# Patient Record
Sex: MALE | Race: BLACK OR AFRICAN AMERICAN
[De-identification: names, ages, dates, MRNs, and addresses within clinical notes are randomized per-mention and may not be internally consistent; named-entity substitution may affect disease eponyms.]

---

## 2019-11-07 ENCOUNTER — HOSPITAL ENCOUNTER (INPATIENT)
Dept: HOSPITAL 15 - ER | Age: 54
LOS: 9 days | Discharge: HOME | DRG: 383 | End: 2019-11-16
Attending: HOSPITALIST | Admitting: NURSE PRACTITIONER
Payer: COMMERCIAL

## 2019-11-07 VITALS — HEIGHT: 73 IN | WEIGHT: 242.51 LBS | BODY MASS INDEX: 32.14 KG/M2

## 2019-11-07 DIAGNOSIS — Z80.42: ICD-10-CM

## 2019-11-07 DIAGNOSIS — Z86.73: ICD-10-CM

## 2019-11-07 DIAGNOSIS — E55.9: ICD-10-CM

## 2019-11-07 DIAGNOSIS — I13.0: ICD-10-CM

## 2019-11-07 DIAGNOSIS — N18.9: ICD-10-CM

## 2019-11-07 DIAGNOSIS — B95.62: ICD-10-CM

## 2019-11-07 DIAGNOSIS — D63.1: ICD-10-CM

## 2019-11-07 DIAGNOSIS — E11.21: ICD-10-CM

## 2019-11-07 DIAGNOSIS — B96.89: ICD-10-CM

## 2019-11-07 DIAGNOSIS — D50.9: ICD-10-CM

## 2019-11-07 DIAGNOSIS — Z91.19: ICD-10-CM

## 2019-11-07 DIAGNOSIS — E11.622: ICD-10-CM

## 2019-11-07 DIAGNOSIS — Z82.49: ICD-10-CM

## 2019-11-07 DIAGNOSIS — I21.4: ICD-10-CM

## 2019-11-07 DIAGNOSIS — E44.0: ICD-10-CM

## 2019-11-07 DIAGNOSIS — E11.65: ICD-10-CM

## 2019-11-07 DIAGNOSIS — E11.22: ICD-10-CM

## 2019-11-07 DIAGNOSIS — D72.829: ICD-10-CM

## 2019-11-07 DIAGNOSIS — N17.9: ICD-10-CM

## 2019-11-07 DIAGNOSIS — B96.4: ICD-10-CM

## 2019-11-07 DIAGNOSIS — L97.926: ICD-10-CM

## 2019-11-07 DIAGNOSIS — L03.115: Primary | ICD-10-CM

## 2019-11-07 DIAGNOSIS — E87.6: ICD-10-CM

## 2019-11-07 DIAGNOSIS — G47.00: ICD-10-CM

## 2019-11-07 DIAGNOSIS — Z79.899: ICD-10-CM

## 2019-11-07 DIAGNOSIS — L97.916: ICD-10-CM

## 2019-11-07 DIAGNOSIS — L03.116: ICD-10-CM

## 2019-11-07 DIAGNOSIS — E83.39: ICD-10-CM

## 2019-11-07 DIAGNOSIS — I50.30: ICD-10-CM

## 2019-11-07 LAB
ALBUMIN SERPL-MCNC: 1.9 G/DL (ref 3.4–5)
ALP SERPL-CCNC: 71 U/L (ref 45–117)
ALT SERPL-CCNC: 17 U/L (ref 16–61)
ANION GAP SERPL CALCULATED.3IONS-SCNC: 9 MMOL/L (ref 5–15)
APTT PPP: 33 SEC (ref 23.64–32.05)
BILIRUB SERPL-MCNC: 0.4 MG/DL (ref 0.2–1)
BUN SERPL-MCNC: 17 MG/DL (ref 7–18)
BUN/CREAT SERPL: 10.3
CALCIUM SERPL-MCNC: 8.2 MG/DL (ref 8.5–10.1)
CHLORIDE SERPL-SCNC: 106 MMOL/L (ref 98–107)
CO2 SERPL-SCNC: 24 MMOL/L (ref 21–32)
GLUCOSE SERPL-MCNC: 99 MG/DL (ref 74–106)
HCT VFR BLD AUTO: 20.7 % (ref 41–53)
HGB BLD-MCNC: 6.4 G/DL (ref 13.5–17.5)
INR PPP: 1.06 (ref 0.9–1.15)
LACTATE PLASV-SCNC: 2.1 MMOL/L (ref 0.4–2)
MAGNESIUM SERPL-MCNC: 2.1 MG/DL (ref 1.6–2.6)
MCH RBC QN AUTO: 24.2 PG (ref 28–32)
MCV RBC AUTO: 78.9 FL (ref 80–100)
NRBC BLD QL AUTO: 0 %
POTASSIUM SERPL-SCNC: 3.6 MMOL/L (ref 3.5–5.1)
PROT SERPL-MCNC: 8 G/DL (ref 6.4–8.2)
SODIUM SERPL-SCNC: 139 MMOL/L (ref 136–145)

## 2019-11-07 PROCEDURE — 85730 THROMBOPLASTIN TIME PARTIAL: CPT

## 2019-11-07 PROCEDURE — 83036 HEMOGLOBIN GLYCOSYLATED A1C: CPT

## 2019-11-07 PROCEDURE — 86901 BLOOD TYPING SEROLOGIC RH(D): CPT

## 2019-11-07 PROCEDURE — 83735 ASSAY OF MAGNESIUM: CPT

## 2019-11-07 PROCEDURE — 80053 COMPREHEN METABOLIC PANEL: CPT

## 2019-11-07 PROCEDURE — 82306 VITAMIN D 25 HYDROXY: CPT

## 2019-11-07 PROCEDURE — 87205 SMEAR GRAM STAIN: CPT

## 2019-11-07 PROCEDURE — 80202 ASSAY OF VANCOMYCIN: CPT

## 2019-11-07 PROCEDURE — 76775 US EXAM ABDO BACK WALL LIM: CPT

## 2019-11-07 PROCEDURE — 83970 ASSAY OF PARATHORMONE: CPT

## 2019-11-07 PROCEDURE — 83550 IRON BINDING TEST: CPT

## 2019-11-07 PROCEDURE — 36415 COLL VENOUS BLD VENIPUNCTURE: CPT

## 2019-11-07 PROCEDURE — 85610 PROTHROMBIN TIME: CPT

## 2019-11-07 PROCEDURE — 87081 CULTURE SCREEN ONLY: CPT

## 2019-11-07 PROCEDURE — 71045 X-RAY EXAM CHEST 1 VIEW: CPT

## 2019-11-07 PROCEDURE — 73700 CT LOWER EXTREMITY W/O DYE: CPT

## 2019-11-07 PROCEDURE — 86920 COMPATIBILITY TEST SPIN: CPT

## 2019-11-07 PROCEDURE — 86850 RBC ANTIBODY SCREEN: CPT

## 2019-11-07 PROCEDURE — 93306 TTE W/DOPPLER COMPLETE: CPT

## 2019-11-07 PROCEDURE — 84550 ASSAY OF BLOOD/URIC ACID: CPT

## 2019-11-07 PROCEDURE — 93926 LOWER EXTREMITY STUDY: CPT

## 2019-11-07 PROCEDURE — 85027 COMPLETE CBC AUTOMATED: CPT

## 2019-11-07 PROCEDURE — 83540 ASSAY OF IRON: CPT

## 2019-11-07 PROCEDURE — 86900 BLOOD TYPING SEROLOGIC ABO: CPT

## 2019-11-07 PROCEDURE — 78315 BONE IMAGING 3 PHASE: CPT

## 2019-11-07 PROCEDURE — 86141 C-REACTIVE PROTEIN HS: CPT

## 2019-11-07 PROCEDURE — 96361 HYDRATE IV INFUSION ADD-ON: CPT

## 2019-11-07 PROCEDURE — 85007 BL SMEAR W/DIFF WBC COUNT: CPT

## 2019-11-07 PROCEDURE — 87040 BLOOD CULTURE FOR BACTERIA: CPT

## 2019-11-07 PROCEDURE — 84484 ASSAY OF TROPONIN QUANT: CPT

## 2019-11-07 PROCEDURE — 80048 BASIC METABOLIC PNL TOTAL CA: CPT

## 2019-11-07 PROCEDURE — 87186 SC STD MICRODIL/AGAR DIL: CPT

## 2019-11-07 PROCEDURE — 87077 CULTURE AEROBIC IDENTIFY: CPT

## 2019-11-07 PROCEDURE — 82962 GLUCOSE BLOOD TEST: CPT

## 2019-11-07 PROCEDURE — 80307 DRUG TEST PRSMV CHEM ANLYZR: CPT

## 2019-11-07 PROCEDURE — 83880 ASSAY OF NATRIURETIC PEPTIDE: CPT

## 2019-11-07 PROCEDURE — 96365 THER/PROPH/DIAG IV INF INIT: CPT

## 2019-11-07 PROCEDURE — 81001 URINALYSIS AUTO W/SCOPE: CPT

## 2019-11-07 PROCEDURE — 85025 COMPLETE CBC W/AUTO DIFF WBC: CPT

## 2019-11-07 PROCEDURE — 82270 OCCULT BLOOD FECES: CPT

## 2019-11-07 PROCEDURE — 84100 ASSAY OF PHOSPHORUS: CPT

## 2019-11-07 PROCEDURE — 83605 ASSAY OF LACTIC ACID: CPT

## 2019-11-07 PROCEDURE — 93005 ELECTROCARDIOGRAM TRACING: CPT

## 2019-11-07 RX ADMIN — FAMOTIDINE SCH MG: 20 TABLET, FILM COATED ORAL at 23:08

## 2019-11-08 VITALS — SYSTOLIC BLOOD PRESSURE: 124 MMHG | DIASTOLIC BLOOD PRESSURE: 76 MMHG

## 2019-11-08 VITALS — DIASTOLIC BLOOD PRESSURE: 71 MMHG | SYSTOLIC BLOOD PRESSURE: 130 MMHG

## 2019-11-08 VITALS — SYSTOLIC BLOOD PRESSURE: 141 MMHG | DIASTOLIC BLOOD PRESSURE: 76 MMHG

## 2019-11-08 VITALS — DIASTOLIC BLOOD PRESSURE: 69 MMHG | SYSTOLIC BLOOD PRESSURE: 142 MMHG

## 2019-11-08 VITALS — DIASTOLIC BLOOD PRESSURE: 90 MMHG | SYSTOLIC BLOOD PRESSURE: 129 MMHG

## 2019-11-08 VITALS — DIASTOLIC BLOOD PRESSURE: 93 MMHG | SYSTOLIC BLOOD PRESSURE: 148 MMHG

## 2019-11-08 VITALS — SYSTOLIC BLOOD PRESSURE: 139 MMHG | DIASTOLIC BLOOD PRESSURE: 68 MMHG

## 2019-11-08 VITALS — DIASTOLIC BLOOD PRESSURE: 97 MMHG | SYSTOLIC BLOOD PRESSURE: 148 MMHG

## 2019-11-08 VITALS — SYSTOLIC BLOOD PRESSURE: 147 MMHG | DIASTOLIC BLOOD PRESSURE: 94 MMHG

## 2019-11-08 VITALS — DIASTOLIC BLOOD PRESSURE: 63 MMHG | SYSTOLIC BLOOD PRESSURE: 117 MMHG

## 2019-11-08 LAB
ALBUMIN SERPL-MCNC: 1.6 G/DL (ref 3.4–5)
ALCOHOL, URINE: < 3 MG/DL (ref 0–5)
ALP SERPL-CCNC: 62 U/L (ref 45–117)
ALT SERPL-CCNC: 14 U/L (ref 16–61)
AMPHETAMINES UR QL SCN: NEGATIVE
ANION GAP SERPL CALCULATED.3IONS-SCNC: 5 MMOL/L (ref 5–15)
BARBITURATES UR QL SCN: NEGATIVE
BENZODIAZ UR QL SCN: NEGATIVE
BILIRUB SERPL-MCNC: 1 MG/DL (ref 0.2–1)
BUN SERPL-MCNC: 14 MG/DL (ref 7–18)
BUN/CREAT SERPL: 8.9
BZE UR QL SCN: NEGATIVE
CALCIUM SERPL-MCNC: 7.2 MG/DL (ref 8.5–10.1)
CANNABINOIDS UR QL SCN: NEGATIVE
CHLORIDE SERPL-SCNC: 106 MMOL/L (ref 98–107)
CO2 SERPL-SCNC: 25 MMOL/L (ref 21–32)
GLUCOSE SERPL-MCNC: 150 MG/DL (ref 74–106)
HCT VFR BLD AUTO: 21.6 % (ref 41–53)
HGB BLD-MCNC: 6.7 G/DL (ref 13.5–17.5)
IRON SERPL-MCNC: 7 UG/DL (ref 65–175)
MCH RBC QN AUTO: 24.9 PG (ref 28–32)
MCV RBC AUTO: 79.7 FL (ref 80–100)
OPIATES UR QL SCN: NEGATIVE
PCP UR QL SCN: NEGATIVE
POTASSIUM SERPL-SCNC: 3.8 MMOL/L (ref 3.5–5.1)
PROT SERPL-MCNC: 6.8 G/DL (ref 6.4–8.2)
SODIUM SERPL-SCNC: 136 MMOL/L (ref 136–145)
TIBC SERPL-MCNC: 118 UG/DL (ref 250–450)

## 2019-11-08 PROCEDURE — 30233N1 TRANSFUSION OF NONAUTOLOGOUS RED BLOOD CELLS INTO PERIPHERAL VEIN, PERCUTANEOUS APPROACH: ICD-10-PCS | Performed by: NURSE PRACTITIONER

## 2019-11-08 RX ADMIN — VANCOMYCIN HYDROCHLORIDE SCH MLS/HR: 750 INJECTION, POWDER, LYOPHILIZED, FOR SOLUTION INTRAVENOUS at 18:09

## 2019-11-08 RX ADMIN — CEFTRIAXONE SODIUM SCH MLS/HR: 1 INJECTION, POWDER, FOR SOLUTION INTRAMUSCULAR; INTRAVENOUS at 09:14

## 2019-11-08 RX ADMIN — Medication SCH STRIP: at 17:51

## 2019-11-08 RX ADMIN — VANCOMYCIN HYDROCHLORIDE SCH MLS/HR: 750 INJECTION, POWDER, LYOPHILIZED, FOR SOLUTION INTRAVENOUS at 06:16

## 2019-11-08 RX ADMIN — ACETAMINOPHEN PRN MG: 325 TABLET ORAL at 20:26

## 2019-11-08 RX ADMIN — FAMOTIDINE SCH MG: 20 TABLET, FILM COATED ORAL at 09:14

## 2019-11-08 RX ADMIN — Medication SCH STRIP: at 00:00

## 2019-11-08 RX ADMIN — HUMAN INSULIN SCH UNITS: 100 INJECTION, SOLUTION SUBCUTANEOUS at 06:00

## 2019-11-08 RX ADMIN — ACETAMINOPHEN PRN MG: 325 TABLET ORAL at 01:00

## 2019-11-08 RX ADMIN — HUMAN INSULIN SCH UNITS: 100 INJECTION, SOLUTION SUBCUTANEOUS at 00:00

## 2019-11-08 RX ADMIN — METOPROLOL TARTRATE SCH MG: 25 TABLET, FILM COATED ORAL at 21:50

## 2019-11-08 RX ADMIN — MUPIROCIN SCH APPLIC: 20 OINTMENT TOPICAL at 21:50

## 2019-11-08 RX ADMIN — FUROSEMIDE SCH MG: 10 INJECTION, SOLUTION INTRAMUSCULAR; INTRAVENOUS at 10:32

## 2019-11-08 RX ADMIN — METOPROLOL TARTRATE SCH MG: 25 TABLET, FILM COATED ORAL at 10:33

## 2019-11-08 RX ADMIN — HUMAN INSULIN SCH UNITS: 100 INJECTION, SOLUTION SUBCUTANEOUS at 12:00

## 2019-11-08 RX ADMIN — Medication SCH STRIP: at 12:14

## 2019-11-08 RX ADMIN — HUMAN INSULIN SCH UNITS: 100 INJECTION, SOLUTION SUBCUTANEOUS at 23:24

## 2019-11-08 RX ADMIN — HUMAN INSULIN SCH UNITS: 100 INJECTION, SOLUTION SUBCUTANEOUS at 18:09

## 2019-11-08 RX ADMIN — PANTOPRAZOLE SODIUM SCH MG: 40 TABLET, DELAYED RELEASE ORAL at 21:51

## 2019-11-08 RX ADMIN — Medication SCH STRIP: at 06:16

## 2019-11-08 RX ADMIN — Medication SCH STRIP: at 23:24

## 2019-11-08 NOTE — NUR
PATIENT AT THIS TIME REFUSING DRESSING CHANGE



ATTEMPTED TO REMOVE DRESSINGS AND TAKE WOUND CARE PHOTOS. PATIENT AT THIS TIME REFUSING 
STATING THAT IT HURTS AND WANTS IT LEFT ALONE. WILL ATTEMPT AT ANOTHER TIME.

## 2019-11-08 NOTE — NUR
PT REFUSING MEDICATION AT THIS TIME. PT STATED HE "WAS NOT GOING TO TAKE ANY MEDICATION" I 
EDUCATED ON THE NEED FOR IT. PT VERBALIZED UNDERSTANDING. WILL CONTINUE TO MONITOR.

## 2019-11-08 NOTE — NUR
Opening Shift Note

Assumed care of patient, awake and alert.  No S/S of distress/SOB or pain.  Instructed on 
POC and to call for assist PRN, will continue to monitor for changes Q1hr and PRN. Bed set 
in lowest locked position with side rails up x 2 for safety and call light within reach.

## 2019-11-08 NOTE — NUR
New orders received from MD [place], Dr. Younger, ordered Bactroban to be administered to both nares for 5 days.

## 2019-11-08 NOTE — NUR
PAGED DR. ROBISON. PT Hg IS AT 6.7 AFTER 1 UNIT FO BLOOD. Hg WAS AT 6.4. LEFT MESSAGE TO INFORM 
AND CALL BACK. AWAITING CALL BACK. NOTIFIED CHARGE, LY IBRAHIM.

## 2019-11-08 NOTE — NUR
RECEIVED PATIENT FROM ER.



REPORT RECEIVED FROM LAUREN MODI. PATIENT SHOWING NO SIGN OF DISTRESS, SHORTNESS OF BREATH, 
AND PATIENT DENIES ANY PAIN AT THIS TIME. NEW IV STARTED IN LEFT HAND 22 GAUGE. LAB AT 
BEDSIDE AND LACTIC ACID DRAWN. BLOOD TRANSFUSION CONSENT SIGNED AT THIS TIME. BP ELEVATED AT 
161/77. TEMP .5 HEART RATE . WILL ATTEMPT TO STABILIZE VITALS PRIOR TO BLOOD 
TRANSFUSION. EDUCATED PATIENT ON POC. PATIENT VERBALIZED UNDERSTANDING. BED LOWERED, CALL 
LIGHT WITHIN REACH, AND PATIENT WILL BE ROUNDED ON EVERY HOUR AND AS NEEDED.

## 2019-11-08 NOTE — NUR
CONTACTED HOSPITALIST



RECEIVED SIGNATURE FOR BLOOD TRANSFUSION. REQUESTED ADDITIONAL MEDICATION FOR PATIENT'S 
ELEVATED BP /77. ORDERS RECEIVED. WILL PLACE ORDERS. WILL CONTINUE TO MONITOR PATIENT 
AT THIS TIME.

## 2019-11-08 NOTE — NUR
DRESSING CHANGE PERFORMED



WOUND CARE PHOTOS TAKEN AND CULTURE SWABS OBTAINED. DRESSINGS CHANGED AND PLACED WITH 
ABDOMINAL PADS AND KERLEX. PATIENT TOLERATED WELL. WILL CONTINUE TO MONITOR. WOUND CONSULT 
ALREADY IN PLACE. WILL INFORM DAYSHIFT RN.

## 2019-11-08 NOTE — NUR
WOUND CARE NOTE: 

Wound Care in to see patient per wound care request regarding "bilateral leg wounds"  that 
are noted present on admission. Bedside nurse took photograph of patient's wounds  upon 
admission for reference. Patient is 54 years old male with admitting diagnosis of Bilateral 
Infected Leg wounds. Patient with history of  DM.   Patient is resting in bed in Rm. 274A. 
He's  awake, alert and oriented. He's in no stated pain at this time, however reports of 
pain to BLE wounds when touched. He's able to assist in turning and repositioning. 



Skin/wound assessment done with the assistance of  orienting nurse, LY Donaldson. Noted  Rt. 
(10s61s1.8cm) and Lt (21q20v6.5cm) has large open, draining wounds. Wounds are in whole 
lower leg and larger to lateral and medial area. Anterior distal Rt lower leg  wound has 
visible tendon. Wound beds are pale pink with yellow slough, dark red periwound,  moderate 
seropurulent  drainage noted with foul odor noted.  Wound culture specimen reported sent to 
lab and in process. Patient reported that wound started "since January or February" but 
admitted to not seeing a doctor for it  "only [facility]". Cleansed BLE 
wounds with wound cleanser,patted dry with gauze, applied Thera honey gel/honey gauze, 
applied petrolatum gauze to exposed tendon to anterior distal Rt lower leg wound, covered 
with Opti lock and absorbent pads, wrapped with Kerlix and secured with tape. Patient 
tolerated well.  Patient turned to his side to visualize sacrum and back, no pressure injury 
noted.  Patient's education given regarding skin/ wound care, wound healing and skin 
protection measures. Patient verbalized understanding. Repositioned patient for comfort. 
Patient has pending surgical consult for BLE wounds. 



RECOMMENDATION: Daily/PRN dressing change to BLE wound per MD order, dietary 
consult,surgical consult, frequent turning and repositioning schedule as condition permits, 
redistribute pressure points with pillows, elevate BLE on pillows to help offload pressure, 
continue monitoring by wound care while patient is hospitalized.

-------------------------------------------------------------------------------

Addendum: 11/08/19 at 1715 by Domi Peña RN

-------------------------------------------------------------------------------

Amended: Links added.

## 2019-11-08 NOTE — NUR
Paged MD for change of isolation status.

Awaiting call back, patient is positive for MRSA in nares.

## 2019-11-08 NOTE — NUR
MONITOR TECH CALLED. PT HAD RUN OF RVR. PT ASYMPTOMATIC. /85, HR 86, 98%. PER DAY 
SHIFT HE HAD A RUN DURING THE DAY AND THEY SAID MD IS AWARE. WILL CONTINUE TO MONITOR.

## 2019-11-09 VITALS — SYSTOLIC BLOOD PRESSURE: 142 MMHG | DIASTOLIC BLOOD PRESSURE: 71 MMHG

## 2019-11-09 VITALS — SYSTOLIC BLOOD PRESSURE: 149 MMHG | DIASTOLIC BLOOD PRESSURE: 78 MMHG

## 2019-11-09 VITALS — DIASTOLIC BLOOD PRESSURE: 78 MMHG | SYSTOLIC BLOOD PRESSURE: 151 MMHG

## 2019-11-09 VITALS — DIASTOLIC BLOOD PRESSURE: 91 MMHG | SYSTOLIC BLOOD PRESSURE: 152 MMHG

## 2019-11-09 VITALS — SYSTOLIC BLOOD PRESSURE: 144 MMHG | DIASTOLIC BLOOD PRESSURE: 87 MMHG

## 2019-11-09 VITALS — DIASTOLIC BLOOD PRESSURE: 74 MMHG | SYSTOLIC BLOOD PRESSURE: 119 MMHG

## 2019-11-09 VITALS — DIASTOLIC BLOOD PRESSURE: 80 MMHG | SYSTOLIC BLOOD PRESSURE: 121 MMHG

## 2019-11-09 LAB
ANION GAP SERPL CALCULATED.3IONS-SCNC: 6 MMOL/L (ref 5–15)
BUN SERPL-MCNC: 17 MG/DL (ref 7–18)
BUN/CREAT SERPL: 11.5
CALCIUM SERPL-MCNC: 7.6 MG/DL (ref 8.5–10.1)
CHLORIDE SERPL-SCNC: 106 MMOL/L (ref 98–107)
CO2 SERPL-SCNC: 25 MMOL/L (ref 21–32)
GLUCOSE SERPL-MCNC: 136 MG/DL (ref 74–106)
HCT VFR BLD AUTO: 22.6 % (ref 41–53)
HGB BLD-MCNC: 7.1 G/DL (ref 13.5–17.5)
MCH RBC QN AUTO: 25.7 PG (ref 28–32)
MCV RBC AUTO: 82.2 FL (ref 80–100)
NRBC BLD QL AUTO: 0 %
POTASSIUM SERPL-SCNC: 3.6 MMOL/L (ref 3.5–5.1)
SODIUM SERPL-SCNC: 137 MMOL/L (ref 136–145)

## 2019-11-09 RX ADMIN — VANCOMYCIN HYDROCHLORIDE SCH MLS/HR: 750 INJECTION, POWDER, LYOPHILIZED, FOR SOLUTION INTRAVENOUS at 18:44

## 2019-11-09 RX ADMIN — ACETAMINOPHEN PRN MG: 325 TABLET ORAL at 15:06

## 2019-11-09 RX ADMIN — MUPIROCIN SCH APPLIC: 20 OINTMENT TOPICAL at 15:12

## 2019-11-09 RX ADMIN — METOPROLOL TARTRATE SCH MG: 25 TABLET, FILM COATED ORAL at 22:23

## 2019-11-09 RX ADMIN — HUMAN INSULIN SCH UNITS: 100 INJECTION, SOLUTION SUBCUTANEOUS at 18:00

## 2019-11-09 RX ADMIN — HUMAN INSULIN SCH UNITS: 100 INJECTION, SOLUTION SUBCUTANEOUS at 23:28

## 2019-11-09 RX ADMIN — CEFTRIAXONE SODIUM SCH MLS/HR: 1 INJECTION, POWDER, FOR SOLUTION INTRAMUSCULAR; INTRAVENOUS at 09:53

## 2019-11-09 RX ADMIN — PANTOPRAZOLE SODIUM SCH MG: 40 TABLET, DELAYED RELEASE ORAL at 09:55

## 2019-11-09 RX ADMIN — HUMAN INSULIN SCH UNITS: 100 INJECTION, SOLUTION SUBCUTANEOUS at 06:00

## 2019-11-09 RX ADMIN — Medication SCH STRIP: at 18:00

## 2019-11-09 RX ADMIN — ACETAMINOPHEN PRN MG: 325 TABLET ORAL at 03:50

## 2019-11-09 RX ADMIN — MUPIROCIN SCH APPLIC: 20 OINTMENT TOPICAL at 22:22

## 2019-11-09 RX ADMIN — Medication SCH STRIP: at 23:21

## 2019-11-09 RX ADMIN — METOPROLOL TARTRATE SCH MG: 25 TABLET, FILM COATED ORAL at 09:54

## 2019-11-09 RX ADMIN — FUROSEMIDE SCH MG: 10 INJECTION, SOLUTION INTRAMUSCULAR; INTRAVENOUS at 09:53

## 2019-11-09 RX ADMIN — PANTOPRAZOLE SODIUM SCH MG: 40 TABLET, DELAYED RELEASE ORAL at 22:23

## 2019-11-09 RX ADMIN — HUMAN INSULIN SCH UNITS: 100 INJECTION, SOLUTION SUBCUTANEOUS at 12:00

## 2019-11-09 RX ADMIN — Medication SCH STRIP: at 06:28

## 2019-11-09 RX ADMIN — VANCOMYCIN HYDROCHLORIDE SCH MLS/HR: 750 INJECTION, POWDER, LYOPHILIZED, FOR SOLUTION INTRAVENOUS at 06:41

## 2019-11-09 RX ADMIN — Medication SCH STRIP: at 12:00

## 2019-11-09 RX ADMIN — SODIUM CHLORIDE SCH MLS/HR: 9 INJECTION, SOLUTION INTRAVENOUS at 12:00

## 2019-11-09 NOTE — NUR
Nutrition Consult/assessment Notes



please see attached link for complete assessment



Est. Needs BW 93 [id number] kcal (23-25 kcal/kgBW),  gms pro (1.0-1.2 gms/kgBW d/t 
wounds). Will continue to monitor pertinent labs and reassess nutrient need prn




-------------------------------------------------------------------------------

Addendum: 19 at 1530 by Carla Rutledge RD

-------------------------------------------------------------------------------

Amended: Links added.

## 2019-11-09 NOTE — NUR
Opening Shift Note

Assumed care of patient, awake and alert.  No S/S of distress/SOB or pain.  For safety the 
bed is locked and in the lowest position with 2 side rails up. Instructed on POC and to call 
for assist PRN, will continue to monitor for changes.

## 2019-11-10 VITALS — SYSTOLIC BLOOD PRESSURE: 152 MMHG | DIASTOLIC BLOOD PRESSURE: 94 MMHG

## 2019-11-10 VITALS — DIASTOLIC BLOOD PRESSURE: 98 MMHG | SYSTOLIC BLOOD PRESSURE: 154 MMHG

## 2019-11-10 VITALS — DIASTOLIC BLOOD PRESSURE: 86 MMHG | SYSTOLIC BLOOD PRESSURE: 151 MMHG

## 2019-11-10 VITALS — SYSTOLIC BLOOD PRESSURE: 160 MMHG | DIASTOLIC BLOOD PRESSURE: 91 MMHG

## 2019-11-10 LAB
ANION GAP SERPL CALCULATED.3IONS-SCNC: 6 MMOL/L (ref 5–15)
BUN SERPL-MCNC: 17 MG/DL (ref 7–18)
BUN/CREAT SERPL: 12.2
CALCIUM SERPL-MCNC: 7.6 MG/DL (ref 8.5–10.1)
CHLORIDE SERPL-SCNC: 104 MMOL/L (ref 98–107)
CO2 SERPL-SCNC: 27 MMOL/L (ref 21–32)
GLUCOSE SERPL-MCNC: 134 MG/DL (ref 74–106)
HCT VFR BLD AUTO: 22.8 % (ref 41–53)
HGB BLD-MCNC: 7.4 G/DL (ref 13.5–17.5)
MCH RBC QN AUTO: 25.8 PG (ref 28–32)
MCV RBC AUTO: 79.8 FL (ref 80–100)
NRBC BLD QL AUTO: 0 %
POTASSIUM SERPL-SCNC: 3.6 MMOL/L (ref 3.5–5.1)
SODIUM SERPL-SCNC: 137 MMOL/L (ref 136–145)

## 2019-11-10 RX ADMIN — MUPIROCIN SCH APPLIC: 20 OINTMENT TOPICAL at 11:41

## 2019-11-10 RX ADMIN — METOPROLOL TARTRATE SCH MG: 25 TABLET, FILM COATED ORAL at 21:55

## 2019-11-10 RX ADMIN — PANTOPRAZOLE SODIUM SCH MG: 40 TABLET, DELAYED RELEASE ORAL at 21:54

## 2019-11-10 RX ADMIN — HUMAN INSULIN SCH UNITS: 100 INJECTION, SOLUTION SUBCUTANEOUS at 17:58

## 2019-11-10 RX ADMIN — PANTOPRAZOLE SODIUM SCH MG: 40 TABLET, DELAYED RELEASE ORAL at 11:40

## 2019-11-10 RX ADMIN — VANCOMYCIN HYDROCHLORIDE SCH MLS/HR: 750 INJECTION, POWDER, LYOPHILIZED, FOR SOLUTION INTRAVENOUS at 17:58

## 2019-11-10 RX ADMIN — Medication SCH STRIP: at 12:22

## 2019-11-10 RX ADMIN — SODIUM CHLORIDE SCH MLS/HR: 9 INJECTION, SOLUTION INTRAVENOUS at 06:26

## 2019-11-10 RX ADMIN — Medication SCH STRIP: at 18:00

## 2019-11-10 RX ADMIN — VANCOMYCIN HYDROCHLORIDE SCH MLS/HR: 750 INJECTION, POWDER, LYOPHILIZED, FOR SOLUTION INTRAVENOUS at 05:56

## 2019-11-10 RX ADMIN — METOPROLOL TARTRATE SCH MG: 25 TABLET, FILM COATED ORAL at 11:13

## 2019-11-10 RX ADMIN — FUROSEMIDE SCH MG: 20 TABLET ORAL at 11:13

## 2019-11-10 RX ADMIN — Medication SCH STRIP: at 05:56

## 2019-11-10 RX ADMIN — POTASSIUM CHLORIDE SCH MEQ: 750 TABLET, FILM COATED, EXTENDED RELEASE ORAL at 11:11

## 2019-11-10 RX ADMIN — SODIUM CHLORIDE SCH MLS/HR: 9 INJECTION, SOLUTION INTRAVENOUS at 17:41

## 2019-11-10 RX ADMIN — HUMAN INSULIN SCH UNITS: 100 INJECTION, SOLUTION SUBCUTANEOUS at 05:56

## 2019-11-10 RX ADMIN — MUPIROCIN SCH APPLIC: 20 OINTMENT TOPICAL at 21:56

## 2019-11-10 RX ADMIN — HUMAN INSULIN SCH UNITS: 100 INJECTION, SOLUTION SUBCUTANEOUS at 12:22

## 2019-11-10 RX ADMIN — SODIUM CHLORIDE SCH MLS/HR: 9 INJECTION, SOLUTION INTRAVENOUS at 11:10

## 2019-11-10 NOTE — NUR
Opening Shift Note

Assumed care of patient, awake and alert.  No S/S of distress/SOB or pain.  For safety the 
bed is locked, in the lowest position with 2 side rails up. Instructed on POC and to call 
for assist PRN, will continue to monitor for changes.

## 2019-11-10 NOTE — NUR
RECEIVED REPORT FROM DAY RN POC REVIEWED, BED ALARM INTACT, CALL LIGHT WITHIN REACH REMINDED 
PT TO CALL FOR ASSISTANCE BEFORE GETTING  OOB, PT VERBALIZED UNDERSTANDING

## 2019-11-10 NOTE — NUR
Educated patient on multiple occasions through out shift on him refusing his dressing change 
to BLE. Pt states he "cant take the pain to let it get done". Offered pain medications 
before the dressing change but pt still refused. Family at bedside and encouraging pt to 
receive the care but he continues to refuse care.

## 2019-11-11 VITALS — SYSTOLIC BLOOD PRESSURE: 127 MMHG | DIASTOLIC BLOOD PRESSURE: 80 MMHG

## 2019-11-11 VITALS — DIASTOLIC BLOOD PRESSURE: 100 MMHG | SYSTOLIC BLOOD PRESSURE: 162 MMHG

## 2019-11-11 VITALS — SYSTOLIC BLOOD PRESSURE: 163 MMHG | DIASTOLIC BLOOD PRESSURE: 101 MMHG

## 2019-11-11 VITALS — DIASTOLIC BLOOD PRESSURE: 100 MMHG | SYSTOLIC BLOOD PRESSURE: 149 MMHG

## 2019-11-11 VITALS — SYSTOLIC BLOOD PRESSURE: 139 MMHG | DIASTOLIC BLOOD PRESSURE: 90 MMHG

## 2019-11-11 LAB
ANION GAP SERPL CALCULATED.3IONS-SCNC: 5 MMOL/L (ref 5–15)
BUN SERPL-MCNC: 16 MG/DL (ref 7–18)
BUN/CREAT SERPL: 11.5
CALCIUM SERPL-MCNC: 7.7 MG/DL (ref 8.5–10.1)
CHLORIDE SERPL-SCNC: 104 MMOL/L (ref 98–107)
CO2 SERPL-SCNC: 27 MMOL/L (ref 21–32)
GLUCOSE SERPL-MCNC: 120 MG/DL (ref 74–106)
HCT VFR BLD AUTO: 24.5 % (ref 41–53)
HGB BLD-MCNC: 7.7 G/DL (ref 13.5–17.5)
MCH RBC QN AUTO: 25.4 PG (ref 28–32)
MCV RBC AUTO: 81.2 FL (ref 80–100)
NRBC BLD QL AUTO: 0 %
POTASSIUM SERPL-SCNC: 3.8 MMOL/L (ref 3.5–5.1)
SODIUM SERPL-SCNC: 136 MMOL/L (ref 136–145)

## 2019-11-11 RX ADMIN — Medication SCH STRIP: at 06:10

## 2019-11-11 RX ADMIN — Medication SCH STRIP: at 18:17

## 2019-11-11 RX ADMIN — HUMAN INSULIN SCH UNITS: 100 INJECTION, SOLUTION SUBCUTANEOUS at 06:00

## 2019-11-11 RX ADMIN — Medication SCH STRIP: at 00:12

## 2019-11-11 RX ADMIN — METOPROLOL TARTRATE SCH MG: 25 TABLET, FILM COATED ORAL at 11:38

## 2019-11-11 RX ADMIN — SODIUM CHLORIDE SCH MLS/HR: 9 INJECTION, SOLUTION INTRAVENOUS at 06:40

## 2019-11-11 RX ADMIN — VANCOMYCIN HYDROCHLORIDE SCH MLS/HR: 750 INJECTION, POWDER, LYOPHILIZED, FOR SOLUTION INTRAVENOUS at 18:00

## 2019-11-11 RX ADMIN — POTASSIUM CHLORIDE SCH MEQ: 750 TABLET, FILM COATED, EXTENDED RELEASE ORAL at 10:08

## 2019-11-11 RX ADMIN — SODIUM CHLORIDE SCH MLS/HR: 9 INJECTION, SOLUTION INTRAVENOUS at 15:45

## 2019-11-11 RX ADMIN — VANCOMYCIN HYDROCHLORIDE SCH MLS/HR: 750 INJECTION, POWDER, LYOPHILIZED, FOR SOLUTION INTRAVENOUS at 06:10

## 2019-11-11 RX ADMIN — HUMAN INSULIN SCH UNITS: 100 INJECTION, SOLUTION SUBCUTANEOUS at 18:16

## 2019-11-11 RX ADMIN — PANTOPRAZOLE SODIUM SCH MG: 40 TABLET, DELAYED RELEASE ORAL at 21:54

## 2019-11-11 RX ADMIN — MUPIROCIN SCH APPLIC: 20 OINTMENT TOPICAL at 10:10

## 2019-11-11 RX ADMIN — HUMAN INSULIN SCH UNITS: 100 INJECTION, SOLUTION SUBCUTANEOUS at 00:12

## 2019-11-11 RX ADMIN — METOPROLOL TARTRATE SCH MG: 25 TABLET, FILM COATED ORAL at 21:54

## 2019-11-11 RX ADMIN — MUPIROCIN SCH APPLIC: 20 OINTMENT TOPICAL at 21:54

## 2019-11-11 RX ADMIN — HUMAN INSULIN SCH UNITS: 100 INJECTION, SOLUTION SUBCUTANEOUS at 13:54

## 2019-11-11 RX ADMIN — Medication SCH STRIP: at 13:53

## 2019-11-11 RX ADMIN — FUROSEMIDE SCH MG: 20 TABLET ORAL at 11:38

## 2019-11-11 RX ADMIN — SODIUM CHLORIDE SCH MLS/HR: 9 INJECTION, SOLUTION INTRAVENOUS at 18:17

## 2019-11-11 RX ADMIN — PANTOPRAZOLE SODIUM SCH MG: 40 TABLET, DELAYED RELEASE ORAL at 10:08

## 2019-11-11 NOTE — NUR
IV Insertion

IV access obtained, via clean sterile technique by inserting 22 gauge catheter after one 
attempt to left wrist. IV secured properly. No trauma to site. Patient tolerated well. IV 
removed from right forearm, catheter intact intact, pressure dressing applied. Patient 
tolerated well. Will continue to monitor Q1 hour and PRN.

## 2019-11-11 NOTE — NUR
Wound Care to right lower extremity 

Wound care completed on patients right lower extremity. Dressing removed from right lower 
extremity, area cleaned with wound cleanser and pat dry with sterile gauze. Honey gauze 
applied to area then covered with abd pads and wrapped in Kerlix. Will continue to monitor 
Q1 hour and PRN.

## 2019-11-11 NOTE — NUR
Opening Note

Received report from night shift RN. Patient is awake, alert and oriented x4. No signs or 
symptoms of distress noted at this time. Patient has dressing to bilateral lower 
extremities. Patient denies pain at this time. Reviewed plan of care with patient, patient 
verbalize understanding. Bed in low and locked position, call light within reach. Will 
continue to monitor Q1 hour and PRN.

## 2019-11-11 NOTE — NUR
Wound Care to left lower extremity 

Wound care completed on patients left lower extremity. Patient states he needs to take a 
break before we complete wound care on the right lower extremity. Dressing removed from left 
lower extremity, area cleaned with wound cleanser and pat dry with sterile gauze. Honey 
gauze applied to area then covered with abd pads and wrapped in Kerlix. Will continue to 
monitor Q1 hour and PRN.

## 2019-11-12 VITALS — DIASTOLIC BLOOD PRESSURE: 77 MMHG | SYSTOLIC BLOOD PRESSURE: 143 MMHG

## 2019-11-12 VITALS — SYSTOLIC BLOOD PRESSURE: 160 MMHG | DIASTOLIC BLOOD PRESSURE: 92 MMHG

## 2019-11-12 VITALS — DIASTOLIC BLOOD PRESSURE: 80 MMHG | SYSTOLIC BLOOD PRESSURE: 130 MMHG

## 2019-11-12 VITALS — DIASTOLIC BLOOD PRESSURE: 91 MMHG | SYSTOLIC BLOOD PRESSURE: 158 MMHG

## 2019-11-12 VITALS — SYSTOLIC BLOOD PRESSURE: 143 MMHG | DIASTOLIC BLOOD PRESSURE: 91 MMHG

## 2019-11-12 LAB
ANION GAP SERPL CALCULATED.3IONS-SCNC: 5 MMOL/L (ref 5–15)
BUN SERPL-MCNC: 16 MG/DL (ref 7–18)
BUN/CREAT SERPL: 11.7
CALCIUM SERPL-MCNC: 7.5 MG/DL (ref 8.5–10.1)
CHLORIDE SERPL-SCNC: 104 MMOL/L (ref 98–107)
CO2 SERPL-SCNC: 28 MMOL/L (ref 21–32)
GLUCOSE SERPL-MCNC: 101 MG/DL (ref 74–106)
HCT VFR BLD AUTO: 22.5 % (ref 41–53)
HGB BLD-MCNC: 7.1 G/DL (ref 13.5–17.5)
MCH RBC QN AUTO: 26.3 PG (ref 28–32)
MCV RBC AUTO: 83.2 FL (ref 80–100)
NRBC BLD QL AUTO: 0 %
POTASSIUM SERPL-SCNC: 3.4 MMOL/L (ref 3.5–5.1)
SODIUM SERPL-SCNC: 137 MMOL/L (ref 136–145)

## 2019-11-12 RX ADMIN — VANCOMYCIN HYDROCHLORIDE SCH MLS/HR: 750 INJECTION, POWDER, LYOPHILIZED, FOR SOLUTION INTRAVENOUS at 06:25

## 2019-11-12 RX ADMIN — MUPIROCIN SCH APPLIC: 20 OINTMENT TOPICAL at 22:10

## 2019-11-12 RX ADMIN — SODIUM CHLORIDE SCH MLS/HR: 9 INJECTION, SOLUTION INTRAVENOUS at 13:00

## 2019-11-12 RX ADMIN — Medication SCH STRIP: at 06:00

## 2019-11-12 RX ADMIN — VANCOMYCIN HYDROCHLORIDE SCH MLS/HR: 750 INJECTION, POWDER, LYOPHILIZED, FOR SOLUTION INTRAVENOUS at 18:20

## 2019-11-12 RX ADMIN — Medication SCH STRIP: at 00:18

## 2019-11-12 RX ADMIN — MUPIROCIN SCH APPLIC: 20 OINTMENT TOPICAL at 09:09

## 2019-11-12 RX ADMIN — HUMAN INSULIN SCH UNITS: 100 INJECTION, SOLUTION SUBCUTANEOUS at 18:20

## 2019-11-12 RX ADMIN — POTASSIUM CHLORIDE SCH MEQ: 1500 TABLET, EXTENDED RELEASE ORAL at 12:11

## 2019-11-12 RX ADMIN — METOPROLOL TARTRATE SCH MG: 25 TABLET, FILM COATED ORAL at 22:11

## 2019-11-12 RX ADMIN — PANTOPRAZOLE SODIUM SCH MG: 40 TABLET, DELAYED RELEASE ORAL at 22:11

## 2019-11-12 RX ADMIN — Medication SCH STRIP: at 18:20

## 2019-11-12 RX ADMIN — SODIUM CHLORIDE SCH MLS/HR: 9 INJECTION, SOLUTION INTRAVENOUS at 20:24

## 2019-11-12 RX ADMIN — PANTOPRAZOLE SODIUM SCH MG: 40 TABLET, DELAYED RELEASE ORAL at 09:08

## 2019-11-12 RX ADMIN — Medication SCH STRIP: at 12:11

## 2019-11-12 RX ADMIN — POTASSIUM CHLORIDE SCH MEQ: 750 TABLET, FILM COATED, EXTENDED RELEASE ORAL at 09:08

## 2019-11-12 RX ADMIN — METOPROLOL TARTRATE SCH MG: 25 TABLET, FILM COATED ORAL at 09:08

## 2019-11-12 RX ADMIN — SODIUM CHLORIDE SCH MLS/HR: 9 INJECTION, SOLUTION INTRAVENOUS at 09:01

## 2019-11-12 RX ADMIN — HUMAN INSULIN SCH UNITS: 100 INJECTION, SOLUTION SUBCUTANEOUS at 06:00

## 2019-11-12 RX ADMIN — HUMAN INSULIN SCH UNITS: 100 INJECTION, SOLUTION SUBCUTANEOUS at 12:11

## 2019-11-12 RX ADMIN — HUMAN INSULIN SCH UNITS: 100 INJECTION, SOLUTION SUBCUTANEOUS at 00:19

## 2019-11-12 RX ADMIN — FUROSEMIDE SCH MG: 20 TABLET ORAL at 09:08

## 2019-11-12 NOTE — NUR
Right Lower Leg Wound Care 

Right lower extremity dressing with moderate saturation of blood after using bedside 
commode. Dressing removed, area cleaned with wound cleanser and patted dry with sterile 
gauze. Therahoney and petroleum gauze applied to wound bed, as ordered, then covered with 
abd pads and wrapped in Kerlix. Patient tolerated well. Refuses dressing change to left 
lower extremity as dressing is dry and intact.

-------------------------------------------------------------------------------

Addendum: 11/12/19 at 1915 by Cintia Valentino RN

-------------------------------------------------------------------------------

CORRECTION LEFT LOWER LEG, PATIENT REFUSED RIGHT LEG.

## 2019-11-12 NOTE — NUR
IV removal

IV DC'd with clean sterile technique, catheter fully intact. Pressure dressing applied to 
site. Patient tolerated well.

NOTE: [LEFT WRIST PAINFUL AND LEAKING]

## 2019-11-12 NOTE — NUR
Assessment

Pt is a 54 yr old alert and oriented male. Prior to admit, pt was living with some friends 
and states that he was working on establishing [facility] services. Pt's daughter, Nani, is his 
emergency contact and her number is on file. Prior to admit, pt was independent with ADL's, 
and walking but stated that he can no longer walk on his own due to his wounds. Pt would 
benefit from a w/c or walker in the home to assist with ambulation. Pt stated that he was 
receiving  services for wound care through [facility]", and that his wound care 
provider recommended that he goes to the hospital because his wounds are not healing. Pt is 
type 2 diabetic and states that he takes medications to regulate his blood sugar. Pt stated 
that he has never been to a SNF but that he would like to go there for wound care and PT. Pt 
did not want the SW discussing this with the Dr. or nurse until he was able to speak with 
them. Pt currently does not get any income. Pt was self-employed and stated that he is 
"working on getting things settled." SW educated the pt on the option of applying for cash 
assistance and/or food stamps at the [facility] office. Pt stated that he would figure it out. 
Pt stated that he is not interested in AD at this time. Further needs will be assessed 
closer to d/c.

-------------------------------------------------------------------------------

Addendum: 11/12/19 at 1355 by LOCO RICKETTS

-------------------------------------------------------------------------------

Amended: Links added.

## 2019-11-12 NOTE — NUR
Patient Rounds

Patient resting in bed watching television, no s/s of distress or SOB. Will endorse care to 
night shift RN.

## 2019-11-12 NOTE — NUR
Nutrition Follow-up Notes



Wt.: 91.3 kg



Pt was sleeping with no family by bedside. per records pt with osteomyelitis and active sx 
consult. pt with no distress noted currently on CCHO 60 gm/meal diet with adequate PO of 75% 
x 4 per RN doc



Est. Needs BW 93 [id number] kcal (23-25 kcal/kgBW),  gms pro (1.0-1.2 gms/kgBW d/t 
wounds). Will continue to monitor pertinent labs and reassess nutrient need prn



Labs: CREAT 1.37 H, CA 7.5 L. rest labs wnl for today



Skin: Saeed scale 16, mod risk wounds per RN doc

 

GI: Pt had 2 BM  this morning per RN documentation. 



PES: Altered nutrition related lab values r/t current/chronic medical condition aeb elev 
creat, hyperglycemia, hypocalcemia

 

Will continue to monitor PO intake, skin status, pertinent labs and weight trend. F/u in 3-5 
days.



Rec.: 1.) consider MVI/C bid. 2) refer to CDE on DC. 3) consider CCHO 75 gm as needs are 
higher. 4) continue current plan of care

## 2019-11-12 NOTE — NUR
PATIENT REFUSED WOUND DRESSING CHANGED

BILATERAL LEG DRESSINGS DRY AND INTACT WITH VERY MINIMAL DRAINAGE, PATIENT STATES TO LEAVE 
THEM ALONE FOR NOW.

## 2019-11-12 NOTE — NUR
Opening Shift Note

Assumed care of patient, awake and alert. No S/S of distress/SOB, no pain noted or reported. 
Updated on POC and instructed to call for assistance as needed, patient verbalized 
understanding. Bed locked in lowest position, side rails up x2, call light within reach. 
Will continue to monitor for changes Q1hr and PRN.

## 2019-11-13 VITALS — SYSTOLIC BLOOD PRESSURE: 144 MMHG | DIASTOLIC BLOOD PRESSURE: 83 MMHG

## 2019-11-13 VITALS — DIASTOLIC BLOOD PRESSURE: 94 MMHG | SYSTOLIC BLOOD PRESSURE: 150 MMHG

## 2019-11-13 VITALS — DIASTOLIC BLOOD PRESSURE: 80 MMHG | SYSTOLIC BLOOD PRESSURE: 139 MMHG

## 2019-11-13 VITALS — DIASTOLIC BLOOD PRESSURE: 89 MMHG | SYSTOLIC BLOOD PRESSURE: 151 MMHG

## 2019-11-13 VITALS — SYSTOLIC BLOOD PRESSURE: 136 MMHG | DIASTOLIC BLOOD PRESSURE: 76 MMHG

## 2019-11-13 LAB
ANION GAP SERPL CALCULATED.3IONS-SCNC: 6 MMOL/L (ref 5–15)
BUN SERPL-MCNC: 16 MG/DL (ref 7–18)
BUN/CREAT SERPL: 11.9
CALCIUM SERPL-MCNC: 7.8 MG/DL (ref 8.5–10.1)
CHLORIDE SERPL-SCNC: 104 MMOL/L (ref 98–107)
CO2 SERPL-SCNC: 27 MMOL/L (ref 21–32)
GLUCOSE SERPL-MCNC: 146 MG/DL (ref 74–106)
POTASSIUM SERPL-SCNC: 3.6 MMOL/L (ref 3.5–5.1)
SODIUM SERPL-SCNC: 137 MMOL/L (ref 136–145)

## 2019-11-13 RX ADMIN — HUMAN INSULIN SCH UNITS: 100 INJECTION, SOLUTION SUBCUTANEOUS at 23:39

## 2019-11-13 RX ADMIN — METOPROLOL TARTRATE SCH MG: 25 TABLET, FILM COATED ORAL at 22:13

## 2019-11-13 RX ADMIN — MUPIROCIN SCH APPLIC: 20 OINTMENT TOPICAL at 10:19

## 2019-11-13 RX ADMIN — SODIUM CHLORIDE SCH MLS/HR: 9 INJECTION, SOLUTION INTRAVENOUS at 06:52

## 2019-11-13 RX ADMIN — VANCOMYCIN HYDROCHLORIDE SCH MLS/HR: 750 INJECTION, POWDER, LYOPHILIZED, FOR SOLUTION INTRAVENOUS at 05:49

## 2019-11-13 RX ADMIN — SODIUM CHLORIDE SCH MLS/HR: 9 INJECTION, SOLUTION INTRAVENOUS at 18:52

## 2019-11-13 RX ADMIN — HUMAN INSULIN SCH UNITS: 100 INJECTION, SOLUTION SUBCUTANEOUS at 12:00

## 2019-11-13 RX ADMIN — PANTOPRAZOLE SODIUM SCH MG: 40 TABLET, DELAYED RELEASE ORAL at 10:17

## 2019-11-13 RX ADMIN — Medication SCH STRIP: at 23:39

## 2019-11-13 RX ADMIN — Medication SCH STRIP: at 12:56

## 2019-11-13 RX ADMIN — Medication SCH STRIP: at 05:25

## 2019-11-13 RX ADMIN — METOPROLOL TARTRATE SCH MG: 25 TABLET, FILM COATED ORAL at 10:18

## 2019-11-13 RX ADMIN — Medication SCH STRIP: at 18:06

## 2019-11-13 RX ADMIN — FUROSEMIDE SCH MG: 20 TABLET ORAL at 10:18

## 2019-11-13 RX ADMIN — POTASSIUM CHLORIDE SCH MEQ: 1500 TABLET, EXTENDED RELEASE ORAL at 10:17

## 2019-11-13 RX ADMIN — PANTOPRAZOLE SODIUM SCH MG: 40 TABLET, DELAYED RELEASE ORAL at 22:13

## 2019-11-13 RX ADMIN — HUMAN INSULIN SCH UNITS: 100 INJECTION, SOLUTION SUBCUTANEOUS at 18:06

## 2019-11-13 RX ADMIN — MUPIROCIN SCH APPLIC: 20 OINTMENT TOPICAL at 22:13

## 2019-11-13 RX ADMIN — HUMAN INSULIN SCH UNITS: 100 INJECTION, SOLUTION SUBCUTANEOUS at 00:00

## 2019-11-13 RX ADMIN — Medication SCH STRIP: at 00:07

## 2019-11-13 RX ADMIN — HUMAN INSULIN SCH UNITS: 100 INJECTION, SOLUTION SUBCUTANEOUS at 05:25

## 2019-11-13 RX ADMIN — VANCOMYCIN HYDROCHLORIDE SCH MLS/HR: 750 INJECTION, POWDER, LYOPHILIZED, FOR SOLUTION INTRAVENOUS at 18:06

## 2019-11-13 NOTE — NUR
PT REFUSED WOUND CARE



PT INFORMED THAT WOUND CARE NEEDS TO BE DONE. PT STATED THAT HE DOESN'T WANT IT RIGHT NOW 
AND WOULD LIKE TO GET IT DONE TONIGHT BECAUSE IT WILL HURT. PT INFORMED THAT WE WILL START 
SOAKING THE DRESSING NOW TO PREVENT IT FROM STICKING ON HIS SKIN DURING DRESSING CHANGE. PT 
CAN ALSO GET PAIN MEDICATIONS BUT PT STILL REFUSED.

## 2019-11-13 NOTE — NUR
ASSUMED CARE, PT. AWAKE, NO C/O PAIN, DRESSING ON LEGS DRY AND INTACT, PT. REFUSED TO CHANGE 
DRESSING.

## 2019-11-13 NOTE — NUR
Rounds

Patient awake and alert.  No S/S of distress/SOB or pain.  Will continue to monitor changes 
q1hr and PRN. PT ASSISTED TO BSC. BM X 1 TODAY.

## 2019-11-14 VITALS — SYSTOLIC BLOOD PRESSURE: 148 MMHG | DIASTOLIC BLOOD PRESSURE: 61 MMHG

## 2019-11-14 VITALS — DIASTOLIC BLOOD PRESSURE: 77 MMHG | SYSTOLIC BLOOD PRESSURE: 145 MMHG

## 2019-11-14 VITALS — DIASTOLIC BLOOD PRESSURE: 79 MMHG | SYSTOLIC BLOOD PRESSURE: 153 MMHG

## 2019-11-14 VITALS — DIASTOLIC BLOOD PRESSURE: 87 MMHG | SYSTOLIC BLOOD PRESSURE: 162 MMHG

## 2019-11-14 VITALS — DIASTOLIC BLOOD PRESSURE: 82 MMHG | SYSTOLIC BLOOD PRESSURE: 146 MMHG

## 2019-11-14 LAB
ALBUMIN SERPL-MCNC: 1.7 G/DL (ref 3.4–5)
ALP SERPL-CCNC: 62 U/L (ref 45–117)
ALT SERPL-CCNC: 24 U/L (ref 16–61)
ANION GAP SERPL CALCULATED.3IONS-SCNC: 5 MMOL/L (ref 5–15)
BILIRUB SERPL-MCNC: 0.3 MG/DL (ref 0.2–1)
BUN SERPL-MCNC: 16 MG/DL (ref 7–18)
BUN/CREAT SERPL: 12.3
CALCIUM SERPL-MCNC: 8.4 MG/DL (ref 8.5–10.1)
CHLORIDE SERPL-SCNC: 103 MMOL/L (ref 98–107)
CO2 SERPL-SCNC: 29 MMOL/L (ref 21–32)
GLUCOSE SERPL-MCNC: 110 MG/DL (ref 74–106)
POTASSIUM SERPL-SCNC: 4 MMOL/L (ref 3.5–5.1)
PROT SERPL-MCNC: 7.5 G/DL (ref 6.4–8.2)
SODIUM SERPL-SCNC: 137 MMOL/L (ref 136–145)
URATE SERPL-MCNC: 5 MG/DL (ref 3.5–7.2)

## 2019-11-14 RX ADMIN — FUROSEMIDE SCH MG: 20 TABLET ORAL at 11:29

## 2019-11-14 RX ADMIN — METOPROLOL TARTRATE SCH MG: 25 TABLET, FILM COATED ORAL at 23:02

## 2019-11-14 RX ADMIN — Medication SCH STRIP: at 19:03

## 2019-11-14 RX ADMIN — Medication SCH STRIP: at 14:01

## 2019-11-14 RX ADMIN — HUMAN INSULIN SCH UNITS: 100 INJECTION, SOLUTION SUBCUTANEOUS at 14:00

## 2019-11-14 RX ADMIN — MUPIROCIN SCH APPLIC: 20 OINTMENT TOPICAL at 23:01

## 2019-11-14 RX ADMIN — HUMAN INSULIN SCH UNITS: 100 INJECTION, SOLUTION SUBCUTANEOUS at 19:04

## 2019-11-14 RX ADMIN — PANTOPRAZOLE SODIUM SCH MG: 40 TABLET, DELAYED RELEASE ORAL at 11:29

## 2019-11-14 RX ADMIN — Medication SCH STRIP: at 05:16

## 2019-11-14 RX ADMIN — MUPIROCIN SCH APPLIC: 20 OINTMENT TOPICAL at 11:30

## 2019-11-14 RX ADMIN — METOPROLOL TARTRATE SCH MG: 25 TABLET, FILM COATED ORAL at 11:30

## 2019-11-14 RX ADMIN — HUMAN INSULIN SCH UNITS: 100 INJECTION, SOLUTION SUBCUTANEOUS at 05:16

## 2019-11-14 RX ADMIN — POTASSIUM CHLORIDE SCH MEQ: 1500 TABLET, EXTENDED RELEASE ORAL at 11:29

## 2019-11-14 RX ADMIN — VANCOMYCIN HYDROCHLORIDE SCH MLS/HR: 750 INJECTION, POWDER, LYOPHILIZED, FOR SOLUTION INTRAVENOUS at 14:02

## 2019-11-14 RX ADMIN — DEXTROSE SCH MLS/HR: 50 INJECTION, SOLUTION INTRAVENOUS at 11:30

## 2019-11-14 RX ADMIN — SODIUM CHLORIDE SCH MLS/HR: 9 INJECTION, SOLUTION INTRAVENOUS at 06:01

## 2019-11-14 RX ADMIN — PANTOPRAZOLE SODIUM SCH MG: 40 TABLET, DELAYED RELEASE ORAL at 23:02

## 2019-11-14 NOTE — NUR
Opening Shift Note

Assumed care of patient, awake and alert.  No S/S of distress/SOB.  Instructed on POC and to 
call for assist PRN, patient verbalized understanding, call light within reach, will 
continue to monitor for changes Q1hr and PRN.

## 2019-11-14 NOTE — NUR
Dr. Ferguson at bedside

Okay to discharge, will follow up outpatient. Orders received, read back and verified. Will 
input.

## 2019-11-14 NOTE — NUR
Changed dressing to the right lower leg per orders. 

Patient refused pain medication prior, tolerated okay, but patient is in significant pain 
during dressing change. 40 minutes spent changing right leg dressing, was not able to change 
the left lower leg dressing. Will communicate this to NOC shift RN.

## 2019-11-15 VITALS — SYSTOLIC BLOOD PRESSURE: 155 MMHG | DIASTOLIC BLOOD PRESSURE: 96 MMHG

## 2019-11-15 VITALS — DIASTOLIC BLOOD PRESSURE: 88 MMHG | SYSTOLIC BLOOD PRESSURE: 149 MMHG

## 2019-11-15 VITALS — SYSTOLIC BLOOD PRESSURE: 165 MMHG | DIASTOLIC BLOOD PRESSURE: 97 MMHG

## 2019-11-15 VITALS — DIASTOLIC BLOOD PRESSURE: 99 MMHG | SYSTOLIC BLOOD PRESSURE: 163 MMHG

## 2019-11-15 VITALS — DIASTOLIC BLOOD PRESSURE: 97 MMHG | SYSTOLIC BLOOD PRESSURE: 151 MMHG

## 2019-11-15 LAB
ALBUMIN SERPL-MCNC: 1.6 G/DL (ref 3.4–5)
ALP SERPL-CCNC: 66 U/L (ref 45–117)
ALT SERPL-CCNC: 22 U/L (ref 16–61)
ANION GAP SERPL CALCULATED.3IONS-SCNC: 5 MMOL/L (ref 5–15)
BILIRUB SERPL-MCNC: 0.3 MG/DL (ref 0.2–1)
BUN SERPL-MCNC: 14 MG/DL (ref 7–18)
BUN/CREAT SERPL: 10.4
CALCIUM SERPL-MCNC: 8.2 MG/DL (ref 8.5–10.1)
CHLORIDE SERPL-SCNC: 100 MMOL/L (ref 98–107)
CO2 SERPL-SCNC: 30 MMOL/L (ref 21–32)
GLUCOSE SERPL-MCNC: 135 MG/DL (ref 74–106)
POTASSIUM SERPL-SCNC: 4.2 MMOL/L (ref 3.5–5.1)
PROT SERPL-MCNC: 7.7 G/DL (ref 6.4–8.2)
SODIUM SERPL-SCNC: 135 MMOL/L (ref 136–145)

## 2019-11-15 RX ADMIN — MUPIROCIN SCH APPLIC: 20 OINTMENT TOPICAL at 22:46

## 2019-11-15 RX ADMIN — Medication SCH STRIP: at 00:25

## 2019-11-15 RX ADMIN — METOPROLOL TARTRATE SCH MG: 25 TABLET, FILM COATED ORAL at 22:46

## 2019-11-15 RX ADMIN — Medication SCH STRIP: at 23:59

## 2019-11-15 RX ADMIN — HUMAN INSULIN SCH UNITS: 100 INJECTION, SOLUTION SUBCUTANEOUS at 18:00

## 2019-11-15 RX ADMIN — Medication SCH STRIP: at 06:43

## 2019-11-15 RX ADMIN — METOPROLOL TARTRATE SCH MG: 25 TABLET, FILM COATED ORAL at 09:40

## 2019-11-15 RX ADMIN — HUMAN INSULIN SCH UNITS: 100 INJECTION, SOLUTION SUBCUTANEOUS at 06:00

## 2019-11-15 RX ADMIN — PANTOPRAZOLE SODIUM SCH MG: 40 TABLET, DELAYED RELEASE ORAL at 09:39

## 2019-11-15 RX ADMIN — VANCOMYCIN HYDROCHLORIDE SCH MLS/HR: 750 INJECTION, POWDER, LYOPHILIZED, FOR SOLUTION INTRAVENOUS at 06:27

## 2019-11-15 RX ADMIN — POTASSIUM CHLORIDE SCH MEQ: 1500 TABLET, EXTENDED RELEASE ORAL at 09:56

## 2019-11-15 RX ADMIN — DEXTROSE SCH MLS/HR: 50 INJECTION, SOLUTION INTRAVENOUS at 09:39

## 2019-11-15 RX ADMIN — HUMAN INSULIN SCH UNITS: 100 INJECTION, SOLUTION SUBCUTANEOUS at 12:00

## 2019-11-15 RX ADMIN — PANTOPRAZOLE SODIUM SCH MG: 40 TABLET, DELAYED RELEASE ORAL at 22:46

## 2019-11-15 RX ADMIN — Medication SCH STRIP: at 12:00

## 2019-11-15 RX ADMIN — MUPIROCIN SCH APPLIC: 20 OINTMENT TOPICAL at 09:38

## 2019-11-15 RX ADMIN — FUROSEMIDE SCH MG: 20 TABLET ORAL at 09:41

## 2019-11-15 RX ADMIN — HUMAN INSULIN SCH UNITS: 100 INJECTION, SOLUTION SUBCUTANEOUS at 00:00

## 2019-11-15 RX ADMIN — METOPROLOL TARTRATE SCH MG: 25 TABLET, FILM COATED ORAL at 22:00

## 2019-11-15 RX ADMIN — Medication SCH STRIP: at 18:00

## 2019-11-15 NOTE — NUR
Regarding bilateral leg dressings

Minimal amount of bright red blood noted to patient's bottom of patient's dressing to right 
leg after assisting patient from bedside commode back to bed. Patient refusing to have area 
assessed or wound care done to either leg, states that it "hurts too much". Offered patient 
pain medication before changing dressing at a later time during shift, patient refused. 
Patient educated on importance of changing dressings as ordered, patient verbalized 
understanding, continues to refuse. Will try again and continue to monitor patient.

## 2019-11-15 NOTE — NUR
Spoke to Dr. Jiménez

Patient okay to discharge in the morning. Follow up with Dr. Ferguson in 2 to 3 weeks. Orders 
received, read back and verified.

## 2019-11-15 NOTE — NUR
Regarding temperature

Patient's temperature checked again, now 90.2 degrees Fahrenheit orally. No s/s of distress. 
Will continue care.

## 2019-11-15 NOTE — NUR
Opening Shift Note

Assumed care of patient, eyes closed, respirations even and unlabored, appears asleep. 
Patient awakens to name and touch. No S/S of distress/SOB or pain. Bed in lowest locked 
position, side rails up x2, call light within reach. Dressing to bilateral lower extremities 
noted to be clean, dry, and intact. Instructed on POC and to call for assist PRN, will 
continue to monitor for changes Q1hr and PRN.

## 2019-11-15 NOTE — NUR
Temperature

Patient's temperature noted to be 99.9 degrees Fahrenheit during vital sign check. 
Temperature of room cooled and blankets removed. Will recheck and continue to monitor.

## 2019-11-15 NOTE — NUR
Opening Note

Assumed care of patient. He is A & O x4, no s/s of distress. No complaints at this time. POC 
discussed with patient. Will continue to monitor Q1h and PRN. Bed is in lowest, locked 
position, call light within reach, bed rails x2, bed alarm on.

## 2019-11-15 NOTE — NUR
Jessica from [facility] 

Spoke to patient, per Jessica, patient is going to be moving to [facility] upon discharge, does 
not want to go to a SNF.

## 2019-11-15 NOTE — NUR
Refusing Vancomycin Trough

Patient refusing to have lab draw blood for Vancomycin trough at this time. Patient amenable 
to have trough drawn with morning labs.  aware, will speak with pharmacist and 
continue care.

## 2019-11-15 NOTE — NUR
Nutrition Follow-up Notes



Wt.: 94.1 kg as of yesterday.

Pt's in isolation room, asleep, no immediate family member at bedside during rounds this 
morning. Pt's no signs of distress noted earlier, currently on CCHO 60 gms/meal diet with 
adequate PO intake aeb 90% ave. consumed meals (x6) in last 2.5 days. Noted pt's for active 
Surgical and Podiatry consults. 



Est. Needs BW 93 [id number] kcal (23-25 kcal/kgBW),  gms pro (1.0-1.2 gms/kgBW d/t 
wounds). Will continue to monitor pertinent labs and reassess nutrient need prn



Labs: Gluc 135 L, Cr 1.35 H Cr 1.35 H, Ca 8.2 L, Alb 1.6 L



Skin: Saeed scale 18, mod risk, pt's left right legs stasis ulcer per RN documentation. Pls 
refer to latest wound care RN's notes for further details re: tx plans.

 

GI: Pt had 8x BM yesterday per RN documentation. 



PES: Altered nutrition related lab values r/t current/chronic medical condition aeb elev 
creat, hyperglycemia, hypocalcemia

 

Will continue to monitor PO intake, skin status, pertinent labs and weight trend. F/u in 3 
to 5 days.

Rec.: 1.)  Consider Consistent High Carb: 75 gms/meal diet. 2.) If Albumin continues 
trending down with improved renal labs, consider Prostat 1 pkt BID. 3.) Consider daily MVI 
with minerals and Asc acid 500 mgs BID. 4.) Refer pt to CDE/RD for further nutrition 
education and weight monitoring upon discharge. 5.) Continue current plan of care.

## 2019-11-15 NOTE — NUR
IV on LFA out and painful per patient, removed with catheter intact, patient tolerated well



IV insertion

IV access obtained, via clean sterile technique by inserting 22 gauge catheter at RFA after 
[] attempt(s). IV secured properly. No trauma to site. Patient tolerated well.

NOTE: []

## 2019-11-16 VITALS — DIASTOLIC BLOOD PRESSURE: 93 MMHG | SYSTOLIC BLOOD PRESSURE: 139 MMHG

## 2019-11-16 VITALS — SYSTOLIC BLOOD PRESSURE: 155 MMHG | DIASTOLIC BLOOD PRESSURE: 91 MMHG

## 2019-11-16 VITALS — DIASTOLIC BLOOD PRESSURE: 96 MMHG | SYSTOLIC BLOOD PRESSURE: 158 MMHG

## 2019-11-16 LAB
ANION GAP SERPL CALCULATED.3IONS-SCNC: 5 MMOL/L (ref 5–15)
BUN SERPL-MCNC: 19 MG/DL (ref 7–18)
BUN/CREAT SERPL: 12.2
CALCIUM SERPL-MCNC: 8.4 MG/DL (ref 8.5–10.1)
CHLORIDE SERPL-SCNC: 100 MMOL/L (ref 98–107)
CO2 SERPL-SCNC: 28 MMOL/L (ref 21–32)
GLUCOSE SERPL-MCNC: 130 MG/DL (ref 74–106)
POTASSIUM SERPL-SCNC: 4.4 MMOL/L (ref 3.5–5.1)
SODIUM SERPL-SCNC: 133 MMOL/L (ref 136–145)

## 2019-11-16 RX ADMIN — Medication SCH STRIP: at 06:46

## 2019-11-16 RX ADMIN — VANCOMYCIN HYDROCHLORIDE SCH MLS/HR: 750 INJECTION, POWDER, LYOPHILIZED, FOR SOLUTION INTRAVENOUS at 07:00

## 2019-11-16 RX ADMIN — HUMAN INSULIN SCH UNITS: 100 INJECTION, SOLUTION SUBCUTANEOUS at 00:00

## 2019-11-16 RX ADMIN — DEXTROSE SCH MLS/HR: 50 INJECTION, SOLUTION INTRAVENOUS at 10:00

## 2019-11-16 RX ADMIN — FUROSEMIDE SCH MG: 20 TABLET ORAL at 11:15

## 2019-11-16 RX ADMIN — POTASSIUM CHLORIDE SCH MEQ: 1500 TABLET, EXTENDED RELEASE ORAL at 11:15

## 2019-11-16 RX ADMIN — PANTOPRAZOLE SODIUM SCH MG: 40 TABLET, DELAYED RELEASE ORAL at 11:15

## 2019-11-16 RX ADMIN — Medication SCH STRIP: at 12:59

## 2019-11-16 RX ADMIN — MUPIROCIN SCH APPLIC: 20 OINTMENT TOPICAL at 11:13

## 2019-11-16 RX ADMIN — METOPROLOL TARTRATE SCH MG: 25 TABLET, FILM COATED ORAL at 11:16

## 2019-11-16 RX ADMIN — VANCOMYCIN HYDROCHLORIDE SCH MLS/HR: 750 INJECTION, POWDER, LYOPHILIZED, FOR SOLUTION INTRAVENOUS at 06:46

## 2019-11-16 RX ADMIN — HUMAN INSULIN SCH UNITS: 100 INJECTION, SOLUTION SUBCUTANEOUS at 07:00

## 2019-11-16 RX ADMIN — Medication SCH STRIP: at 07:01

## 2019-11-16 RX ADMIN — HUMAN INSULIN SCH UNITS: 100 INJECTION, SOLUTION SUBCUTANEOUS at 12:00

## 2019-11-16 NOTE — NUR
Patient refusing discharge wound photos

Patient states he does not want us to "touch his legs." Unable to take wound photos with 
dressing present on bilateral lower extremities. Will continue to monitor Q1 hour and PRN.

## 2019-11-16 NOTE — NUR
Regarding wound care, blood sugar check and Vancomycin and closing note

Patient lying in bed awake and alert. Bed in lowest locked position, side rails up x2, call 
light within reach. This RN offered to change patient's dressings as ordered, patient 
refusing stating, "I don't want to do it now." Patient again educated on importance of 
changing dressings as ordered, patient again verbalized understanding and again continues to 
refuse. Patient also refusing blood sugar check and Vancomycin at this time. Patient 
educated on importance of both for patient's plan of care, patient continuing to refuse at 
this time. No s/s of distress. Will inform dayshift RN and endorse care.

## 2019-11-16 NOTE — NUR
Patient refusing dressing change at this time.

Patient refusing dressing change at this time. Patient requesting to have right lower 
extremity dressing reinforced. Kerlix applied to right lower extremity. Will continue to 
monitor.

## 2019-11-16 NOTE — NUR
IV REMOVED

RIGHT FOREARM IV REMOVED. IV CATHETER INTACT. PRESSURE DRESSING APPLIED TO SITE.

-------------------------------------------------------------------------------

Signed:    11/16/19 at 1226 by BRUNA SNYDER <Co-Signature Required>

Co-Signed: 11/16/19 at 1226 by DAVIE BARNARD RN RN

-------------------------------------------------------------------------------

## 2019-11-16 NOTE — NUR
Spoke with on call pharmacy, made aware patient refusing Vancomyacin trough. Per pharmacist, 
trough and Vancomyacin dose will be "scheduled later on this morning" with the morning labs. 
Will continue to monitor patient.

## 2019-11-16 NOTE — NUR
Discharge 

Discharge instructions given as ordered. Encourage to follow up with PMD as instructed. All 
questions and concerns addressed. Patient verbalized understanding.  Medication 
reconciliation form completed and copy given to patient. Telemetry monitor removed and sent 
back to ICU. Patient taken down to private vehicle, via wheelchair, accompanied by staff and 
family member, with all personal belongings. No signs or symptoms of distress noted at this 
time.

## 2019-11-18 NOTE — NUR
CALLED IN PRESCRIPTION TO [facility]. [address] [facility] [address] [facility] [address] [phone]

FOR BACTRIM DS ONE PO BID X2 WEEKS /DR. MITCHELL. PATIENT'S PHONE [phone]- PATIENT 
NOTIFIED TO PICK

UP RX.